# Patient Record
Sex: FEMALE | Race: OTHER | HISPANIC OR LATINO | Employment: FULL TIME | URBAN - METROPOLITAN AREA
[De-identification: names, ages, dates, MRNs, and addresses within clinical notes are randomized per-mention and may not be internally consistent; named-entity substitution may affect disease eponyms.]

---

## 2022-10-21 ENCOUNTER — IMMUNIZATION (OUTPATIENT)
Dept: PEDIATRICS | Facility: CLINIC | Age: 32
End: 2022-10-21

## 2022-10-21 PROCEDURE — 90686 IIV4 VACC NO PRSV 0.5 ML IM: CPT | Mod: PBBFAC,PN

## 2024-02-12 ENCOUNTER — OFFICE VISIT (OUTPATIENT)
Dept: INTERNAL MEDICINE | Facility: CLINIC | Age: 34
End: 2024-02-12
Payer: COMMERCIAL

## 2024-02-12 VITALS
OXYGEN SATURATION: 99 % | HEART RATE: 72 BPM | HEIGHT: 63 IN | RESPIRATION RATE: 20 BRPM | TEMPERATURE: 97 F | DIASTOLIC BLOOD PRESSURE: 80 MMHG | SYSTOLIC BLOOD PRESSURE: 114 MMHG | BODY MASS INDEX: 31.41 KG/M2 | WEIGHT: 177.25 LBS

## 2024-02-12 DIAGNOSIS — R10.13 EPIGASTRIC ABDOMINAL PAIN: Primary | ICD-10-CM

## 2024-02-12 DIAGNOSIS — R79.89 ELEVATED LFTS: ICD-10-CM

## 2024-02-12 PROCEDURE — 99999 PR PBB SHADOW E&M-EST. PATIENT-LVL IV: CPT | Mod: PBBFAC,,, | Performed by: INTERNAL MEDICINE

## 2024-02-12 PROCEDURE — 1160F RVW MEDS BY RX/DR IN RCRD: CPT | Mod: CPTII,S$GLB,, | Performed by: INTERNAL MEDICINE

## 2024-02-12 PROCEDURE — 3079F DIAST BP 80-89 MM HG: CPT | Mod: CPTII,S$GLB,, | Performed by: INTERNAL MEDICINE

## 2024-02-12 PROCEDURE — 99203 OFFICE O/P NEW LOW 30 MIN: CPT | Mod: S$GLB,,, | Performed by: INTERNAL MEDICINE

## 2024-02-12 PROCEDURE — 1159F MED LIST DOCD IN RCRD: CPT | Mod: CPTII,S$GLB,, | Performed by: INTERNAL MEDICINE

## 2024-02-12 PROCEDURE — 3008F BODY MASS INDEX DOCD: CPT | Mod: CPTII,S$GLB,, | Performed by: INTERNAL MEDICINE

## 2024-02-12 PROCEDURE — 3074F SYST BP LT 130 MM HG: CPT | Mod: CPTII,S$GLB,, | Performed by: INTERNAL MEDICINE

## 2024-02-12 NOTE — PROGRESS NOTES
"Alley Borrego  33 y.o. Other female  [id number]    Chief Complaint:  Chief Complaint   Patient presents with    Abdominal Pain     New patient to me.     History of Present Illness: Alley Borrego is a 33-year-old female with a history of GERD, vitamin D deficiency, and sleeve gastrectomy who presented with epigastric abdominal pain radiating to her back and RUQ. It has been intermittent for several years, prior to her sleeve gastrectomy in May 2023. The pain is relieved by leaning forward and worsened by palpation. She is unsure if it is related to oral intake. She feels like she cannot breathe due to the pain. This episode of pain started 2 days ago, when she visited the ER at [facility] after eating sushi and drinking 2 cups of alcohol to celebrate [employer] [place]; she made herself vomit prior to her presentation. She reports being told about elevated LFTs and gallstones on U/S, and she was given nausea medication and a "numbing liquid" but is unsure of the names of both. She felt fine yesterday and took Tylenol for L ear and neck pain from a cold she had for 2 weeks. The pain returned today and shifted to the RUQ. Endorses sinus congestion and fatigue but denies dysphagia, odynophagia, and other symptoms. Currently only taking vitamin D.    Laboratory:  Lab Results   Component Value Date    WBC 5.5 10/16/2023    HGB 12.6 10/16/2023    HCT 38.1 10/16/2023     10/16/2023    TSH 0.84 03/08/2021    HGBA1C 5.6 03/08/2021       History:  Past Medical History:   Diagnosis Date    GERD (gastroesophageal reflux disease)        Medications:  No current outpatient medications on file prior to visit.     No current facility-administered medications on file prior to visit.       Allergies:  Review of patient's allergies indicates:  No Known Allergies    Review of Systems   Constitutional:  Negative for chills.   HENT:  Positive for congestion and ear pain. Negative for sore throat.    Eyes:  Negative for blurred " vision, double vision and photophobia.   Respiratory:  Positive for shortness of breath. Negative for cough, hemoptysis and sputum production.    Gastrointestinal:  Positive for abdominal pain. Negative for blood in stool, constipation, diarrhea, heartburn, nausea and vomiting.   Genitourinary:  Negative for dysuria, frequency, hematuria and urgency.   Neurological:  Negative for headaches.       Exam:  Vitals:    02/12/24 1051   BP: 114/80   Pulse: 72   Resp: 20   Temp: 96.8 °F (36 °C)     Weight: 80.4 kg (177 lb 4 oz)   Body mass index is 31.4 kg/m².      Physical Exam  Constitutional:       General: She is not in acute distress.     Appearance: She is not ill-appearing.   HENT:      Right Ear: Tympanic membrane, ear canal and external ear normal. There is no impacted cerumen.      Left Ear: Ear canal and external ear normal. There is no impacted cerumen.      Ears:      Comments: Increased vascularity of left TM     Nose:      Comments: Erythematous nasal canals  Neck:      Comments: Tenderness of left anterior neck under mandible  Cardiovascular:      Rate and Rhythm: Normal rate and regular rhythm.      Pulses: Normal pulses.      Heart sounds: Normal heart sounds. No murmur heard.  Pulmonary:      Effort: Pulmonary effort is normal. No respiratory distress.      Breath sounds: Normal breath sounds.   Abdominal:      General: Bowel sounds are normal.      Palpations: Abdomen is soft.      Tenderness: There is abdominal tenderness (epigastrium + right quadrants). There is no right CVA tenderness, left CVA tenderness, guarding or rebound.      Comments: Pierson's sign +   Musculoskeletal:         General: Tenderness: pt reports back pain is relieved by palpation.   Skin:     General: Skin is warm and dry.   Neurological:      Mental Status: She is alert.   Psychiatric:         Behavior: Behavior normal.         Assessment:  The encounter diagnosis was Epigastric abdominal pain.    Plan:  Epigastric abdominal  pain  -     Hepatic Function Panel; Future; Expected date: 02/12/2024      Isaac Carlson  MS4    I hereby acknowledge that I am relying upon documentation authored by a medical student working under my supervision and further I hereby attest that I have verified the student documentation or findings by personally performing the physical exam and medical decision making activities of the Evaluation and Management service to be billed.     Kimmie Delong D.O.    See addendum below.    Alley was seen today for abdominal pain.    Diagnoses and all orders for this visit:    Epigastric abdominal pain  -     obtain records from [facility]  -     discussed possible etiologies with patient   -     no red flags  -     Hepatic Function Panel; Future    Obtain recent labs and imaging from [facility].     Addendum:   Received records from [facility] ER. Presented on 2/10/24 for epigastric pain.   Lipase 141      US showed cholelithiasis without cholecystitis. It also showed hepatic steatosis.   The record will be scanned into the chart.     Repeat LFT's.

## 2024-02-19 ENCOUNTER — TELEPHONE (OUTPATIENT)
Dept: INTERNAL MEDICINE | Facility: CLINIC | Age: 34
End: 2024-02-19
Payer: COMMERCIAL

## 2024-02-19 NOTE — TELEPHONE ENCOUNTER
LVM for patient to come in anytime between 7 am to 4:40 pm M-F to complete blood work or give our office a called back.

## 2024-03-03 ENCOUNTER — HOSPITAL ENCOUNTER (EMERGENCY)
Facility: HOSPITAL | Age: 34
Discharge: HOME | End: 2024-03-04
Attending: EMERGENCY MEDICINE
Payer: COMMERCIAL

## 2024-03-03 DIAGNOSIS — S00.33XA CONTUSION OF NOSE, INITIAL ENCOUNTER: ICD-10-CM

## 2024-03-03 DIAGNOSIS — S00.83XA CONTUSION OF FOREHEAD, INITIAL ENCOUNTER: Primary | ICD-10-CM

## 2024-03-03 PROCEDURE — 99284 EMERGENCY DEPT VISIT MOD MDM: CPT | Mod: 25

## 2024-03-04 VITALS
RESPIRATION RATE: 20 BRPM | DIASTOLIC BLOOD PRESSURE: 60 MMHG | WEIGHT: 178.5 LBS | TEMPERATURE: 99 F | OXYGEN SATURATION: 100 % | HEART RATE: 80 BPM | SYSTOLIC BLOOD PRESSURE: 110 MMHG | BODY MASS INDEX: 31.62 KG/M2
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
